# Patient Record
Sex: FEMALE | Race: WHITE
[De-identification: names, ages, dates, MRNs, and addresses within clinical notes are randomized per-mention and may not be internally consistent; named-entity substitution may affect disease eponyms.]

---

## 2019-02-12 ENCOUNTER — HOSPITAL ENCOUNTER (EMERGENCY)
Dept: HOSPITAL 31 - C.ER | Age: 34
Discharge: HOME | End: 2019-02-12
Payer: MEDICAID

## 2019-02-12 VITALS — OXYGEN SATURATION: 96 %

## 2019-02-12 VITALS — BODY MASS INDEX: 33.9 KG/M2

## 2019-02-12 VITALS — HEART RATE: 81 BPM | TEMPERATURE: 98.6 F | SYSTOLIC BLOOD PRESSURE: 115 MMHG | DIASTOLIC BLOOD PRESSURE: 80 MMHG

## 2019-02-12 VITALS — RESPIRATION RATE: 20 BRPM

## 2019-02-12 DIAGNOSIS — J06.9: Primary | ICD-10-CM

## 2019-02-12 DIAGNOSIS — Z72.0: ICD-10-CM

## 2019-02-12 NOTE — C.PDOC
History Of Present Illness


34 year old female presents to the ED for evaluation of nasal congestion, 

nonproductive cough and tactile fever which began yesterday. Patient is also 

complaining of left-sided back pain during inspiration. She denies nausea, 

vomiting and diarrhea. 


Time Seen by Provider: 02/12/19 10:02


Chief Complaint (Nursing): Cough, Cold, Congestion


History Per: Patient


History/Exam Limitations: no limitations


Onset/Duration Of Symptoms: Hrs


Current Symptoms Are (Timing): Still Present


Associated Symptoms: Fever, Cough, Nasal Congestion.  denies: Sputum, Nausea, Vo

miting, Diarrhea


Additional History Per: Patient





Past Medical History


Reviewed: Historical Data, Nursing Documentation, Vital Signs


Vital Signs: 





                                Last Vital Signs











Temp  98.7 F   02/12/19 09:51


 


Pulse  88   02/12/19 09:51


 


Resp  20   02/12/19 09:51


 


BP  121/82   02/12/19 09:51


 


Pulse Ox  96   02/12/19 09:51














- Medical History


PMH: No Chronic Diseases


Surgical History: No Surg Hx


Family History: States: Unknown Family Hx





- Social History


Hx Tobacco Use: Yes


Hx Alcohol Use: Yes


Hx Substance Use: No





- Immunization History


Hx Tetanus Toxoid Vaccination: Yes


Hx Influenza Vaccination: No


Hx Pneumococcal Vaccination: No





Review Of Systems


Constitutional: Positive for: Fever


ENT: Positive for: Nose Congestion


Respiratory: Positive for: Cough.  Negative for: Sputum


Gastrointestinal: Negative for: Nausea, Vomiting, Diarrhea





Physical Exam





- Physical Exam


Appears: Non-toxic, No Acute Distress


Skin: Normal Color, Warm, Dry


Head: Atraumatic, Normacephalic


Eye(s): bilateral: Normal Inspection


Ear(s): Bilateral: Normal


Nose: Other (nasal congestion )


Throat: Normal, No Erythema, No Exudate (tonsillar ), Other (uvula is midline)


Neck: Supple


Chest: Symmetrical, No Deformity, No Tenderness


Cardiovascular: Rhythm Regular, No Murmur


Respiratory: Normal Breath Sounds, No Rales, No Rhonchi, No Wheezing


Extremity: Normal ROM, Capillary Refill (less than 2 seconds )


Neurological/Psych: Oriented x3, Normal Speech, Normal Cognition





ED Course And Treatment


O2 Sat by Pulse Oximetry: 96 (on RA)


Pulse Ox Interpretation: Normal





Medical Decision Making


Medical Decision Making: 








Impression: upper respiratory infection, back pain, rule out pneumonia 


Plan:


* CXR


* Motrin PO 


* Sudafed PO 


* reassess and disposition 





Progress:


My interpretation of CXR shows no infiltrates, opacity or signs of pneumonia. 


Motrin PO and Sudafed PO given. 











Disposition





- Disposition


Referrals: 


[facility] [place]


[facility] [Outside]


[facility] [place] [Outside]


Disposition: HOME/ ROUTINE


Disposition Time: 11:18


Condition: GOOD


Prescriptions: 


Ibuprofen [Motrin] 600 mg PO Q6 #20 tab


Pseudoephedrine HCl [Sudafed] 30 mg PO Q6 #10 tablet


Instructions:  Upper Respiratory Infection (ED)


Forms:  [employer] (English), Work Excuse





- Clinical Impression


Clinical Impression: 


 Upper respiratory infection








- Scribe Statement


The provider has reviewed the documentation as recorded by the Scribe (Laila Hoffmann)


Provider Attestation: 








All medical record entries made by the Scribe were at my direction and 

personally dictated by me. I have reviewed the chart and agree that the record 

accurately reflects my personal performance of the history, physical exam, 

medical decision making, and the department course for this patient. I have also

 personally directed, reviewed, and agree with the discharge instructions and 

disposition.

## 2019-02-12 NOTE — RAD
Date of service: 



02/12/2019



HISTORY:

Left-sided chest pain 



COMPARISON:

No prior.



TECHNIQUE:

Chest PA and lateral



FINDINGS:



LINES AND TUBES:

None. 



LUNG AND PLEURA:

The lungs are well inflated and clear. No pleural effusion or 

pneumothorax.



HEART AND MEDIASTINUM:

The heart is not enlarged.  No aortic atherosclerotic calcifications 

present.  The hilar and mediastinal contours are within normal limits.



SKELETAL STRUCTURES:

The bony structures are within normal limits for the patient's age.



VISUALIZED UPPER ABDOMEN:

Normal.



OTHER FINDINGS:

None.



IMPRESSION:

No active pulmonary disease.